# Patient Record
Sex: MALE | Employment: OTHER | URBAN - METROPOLITAN AREA
[De-identification: names, ages, dates, MRNs, and addresses within clinical notes are randomized per-mention and may not be internally consistent; named-entity substitution may affect disease eponyms.]

---

## 2020-11-24 ENCOUNTER — THERAPY VISIT (OUTPATIENT)
Dept: PHYSICAL THERAPY | Facility: CLINIC | Age: 70
End: 2020-11-24
Payer: OTHER MISCELLANEOUS

## 2020-11-24 ENCOUNTER — MEDICAL CORRESPONDENCE (OUTPATIENT)
Dept: PHYSICAL THERAPY | Facility: CLINIC | Age: 70
End: 2020-11-24

## 2020-11-24 DIAGNOSIS — M54.2 NECK PAIN: Primary | ICD-10-CM

## 2020-11-24 PROCEDURE — 97530 THERAPEUTIC ACTIVITIES: CPT | Mod: GP | Performed by: PHYSICAL THERAPIST

## 2020-11-24 PROCEDURE — 97110 THERAPEUTIC EXERCISES: CPT | Mod: GP | Performed by: PHYSICAL THERAPIST

## 2020-11-24 PROCEDURE — 97161 PT EVAL LOW COMPLEX 20 MIN: CPT | Mod: GP | Performed by: PHYSICAL THERAPIST
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
## 2020-11-29 PROBLEM — M54.2 NECK PAIN: Status: ACTIVE | Noted: 2020-11-29

## 2020-12-07 ENCOUNTER — THERAPY VISIT (OUTPATIENT)
Dept: PHYSICAL THERAPY | Facility: CLINIC | Age: 70
End: 2020-12-07
Payer: OTHER MISCELLANEOUS

## 2020-12-07 DIAGNOSIS — M54.2 NECK PAIN: Primary | ICD-10-CM

## 2020-12-07 PROCEDURE — 97530 THERAPEUTIC ACTIVITIES: CPT | Mod: GP | Performed by: PHYSICAL THERAPIST

## 2020-12-07 PROCEDURE — 97110 THERAPEUTIC EXERCISES: CPT | Mod: GP | Performed by: PHYSICAL THERAPIST

## 2020-12-07 PROCEDURE — 97140 MANUAL THERAPY 1/> REGIONS: CPT | Mod: GP | Performed by: PHYSICAL THERAPIST

## 2020-12-10 ENCOUNTER — THERAPY VISIT (OUTPATIENT)
Dept: PHYSICAL THERAPY | Facility: CLINIC | Age: 70
End: 2020-12-10
Payer: OTHER MISCELLANEOUS

## 2020-12-10 DIAGNOSIS — M54.2 NECK PAIN: Primary | ICD-10-CM

## 2020-12-10 PROCEDURE — 97140 MANUAL THERAPY 1/> REGIONS: CPT | Mod: GP | Performed by: PHYSICAL THERAPIST

## 2020-12-10 PROCEDURE — 97530 THERAPEUTIC ACTIVITIES: CPT | Mod: GP | Performed by: PHYSICAL THERAPIST

## 2020-12-14 ENCOUNTER — THERAPY VISIT (OUTPATIENT)
Dept: PHYSICAL THERAPY | Facility: CLINIC | Age: 70
End: 2020-12-14
Payer: OTHER MISCELLANEOUS

## 2020-12-14 DIAGNOSIS — M54.2 NECK PAIN: Primary | ICD-10-CM

## 2020-12-14 PROCEDURE — 97140 MANUAL THERAPY 1/> REGIONS: CPT | Mod: GP | Performed by: PHYSICAL THERAPIST

## 2020-12-14 PROCEDURE — 97530 THERAPEUTIC ACTIVITIES: CPT | Mod: GP | Performed by: PHYSICAL THERAPIST

## 2020-12-21 ENCOUNTER — THERAPY VISIT (OUTPATIENT)
Dept: PHYSICAL THERAPY | Facility: CLINIC | Age: 70
End: 2020-12-21
Payer: OTHER MISCELLANEOUS

## 2020-12-21 DIAGNOSIS — M54.2 NECK PAIN: ICD-10-CM

## 2020-12-21 PROCEDURE — 97140 MANUAL THERAPY 1/> REGIONS: CPT | Mod: GP | Performed by: PHYSICAL THERAPY ASSISTANT

## 2020-12-21 PROCEDURE — 97110 THERAPEUTIC EXERCISES: CPT | Mod: GP | Performed by: PHYSICAL THERAPY ASSISTANT

## 2020-12-28 ENCOUNTER — THERAPY VISIT (OUTPATIENT)
Dept: PHYSICAL THERAPY | Facility: CLINIC | Age: 70
End: 2020-12-28
Payer: OTHER MISCELLANEOUS

## 2020-12-28 DIAGNOSIS — M54.2 NECK PAIN: ICD-10-CM

## 2020-12-28 PROCEDURE — 97530 THERAPEUTIC ACTIVITIES: CPT | Mod: GP | Performed by: PHYSICAL THERAPY ASSISTANT

## 2020-12-28 PROCEDURE — 97110 THERAPEUTIC EXERCISES: CPT | Mod: GP | Performed by: PHYSICAL THERAPY ASSISTANT

## 2021-01-08 ENCOUNTER — TELEPHONE (OUTPATIENT)
Dept: PHYSICAL THERAPY | Facility: CLINIC | Age: 71
End: 2021-01-08

## 2021-01-08 DIAGNOSIS — M54.2 NECK PAIN: ICD-10-CM

## 2021-01-11 ENCOUNTER — THERAPY VISIT (OUTPATIENT)
Dept: PHYSICAL THERAPY | Facility: CLINIC | Age: 71
End: 2021-01-11
Payer: OTHER MISCELLANEOUS

## 2021-01-11 DIAGNOSIS — M54.2 NECK PAIN: Primary | ICD-10-CM

## 2021-01-11 PROCEDURE — 97530 THERAPEUTIC ACTIVITIES: CPT | Mod: GP | Performed by: PHYSICAL THERAPIST

## 2021-01-11 PROCEDURE — 97140 MANUAL THERAPY 1/> REGIONS: CPT | Mod: GP | Performed by: PHYSICAL THERAPIST

## 2021-01-15 ENCOUNTER — THERAPY VISIT (OUTPATIENT)
Dept: PHYSICAL THERAPY | Facility: CLINIC | Age: 71
End: 2021-01-15
Payer: OTHER MISCELLANEOUS

## 2021-01-15 DIAGNOSIS — M54.2 NECK PAIN: Primary | ICD-10-CM

## 2021-01-15 PROCEDURE — 97530 THERAPEUTIC ACTIVITIES: CPT | Mod: GP | Performed by: PHYSICAL THERAPIST

## 2021-01-15 PROCEDURE — 97140 MANUAL THERAPY 1/> REGIONS: CPT | Mod: GP | Performed by: PHYSICAL THERAPIST

## 2021-01-18 ENCOUNTER — THERAPY VISIT (OUTPATIENT)
Dept: PHYSICAL THERAPY | Facility: CLINIC | Age: 71
End: 2021-01-18
Payer: OTHER MISCELLANEOUS

## 2021-01-18 DIAGNOSIS — M54.2 NECK PAIN: Primary | ICD-10-CM

## 2021-01-18 PROCEDURE — 97530 THERAPEUTIC ACTIVITIES: CPT | Mod: GP | Performed by: PHYSICAL THERAPIST

## 2021-01-18 PROCEDURE — 97140 MANUAL THERAPY 1/> REGIONS: CPT | Mod: GP | Performed by: PHYSICAL THERAPIST

## 2021-01-21 ENCOUNTER — THERAPY VISIT (OUTPATIENT)
Dept: PHYSICAL THERAPY | Facility: CLINIC | Age: 71
End: 2021-01-21
Payer: OTHER MISCELLANEOUS

## 2021-01-21 DIAGNOSIS — M54.2 NECK PAIN: Primary | ICD-10-CM

## 2021-01-21 PROCEDURE — 97530 THERAPEUTIC ACTIVITIES: CPT | Mod: GP | Performed by: PHYSICAL THERAPIST

## 2021-01-21 PROCEDURE — 97140 MANUAL THERAPY 1/> REGIONS: CPT | Mod: GP | Performed by: PHYSICAL THERAPIST

## 2021-01-25 ENCOUNTER — THERAPY VISIT (OUTPATIENT)
Dept: PHYSICAL THERAPY | Facility: CLINIC | Age: 71
End: 2021-01-25
Payer: OTHER MISCELLANEOUS

## 2021-01-25 DIAGNOSIS — M54.2 NECK PAIN: Primary | ICD-10-CM

## 2021-01-25 PROCEDURE — 97140 MANUAL THERAPY 1/> REGIONS: CPT | Mod: GP | Performed by: PHYSICAL THERAPIST

## 2021-01-25 NOTE — PROGRESS NOTES
DISCHARGE REPORT    Progress reporting period is from [id number] to [id number]    SUBJECTIVE  Subjective changes noted by patient:  He returns to work tomorrow and is confident that it will go well. He is able to turn head normally. He continues to go to [facility]  6 days a week.      .     Initial Pain level: 8/10.   Changes in function:  Yes (See Goal flowsheet attached for changes in current functional level)  Adverse reaction to treatment or activity: None    OBJECTIVE  Objective: CSROM: all are wnl. UE//ROM is wnl.       ASSESSMENT/PLAN  Updated problem list and treatment plan:   Diagnosis 1:  Neck pain    STG/LTGs have been met or progress has been made towards goals:  Yes (See Goal flow sheet completed today.)  Assessment of Progress: The patient has met all of their long term goals.  Self Management Plans:  Patient is independent in a home treatment program.  Patient is independent in self management of symptoms.    Aki continues to require the following intervention to meet STG and LTG's:  PT intervention is no longer required to meet STG/LTG.    Recommendations:  This patient is ready to be discharged from therapy and continue their home treatment program.

## 2021-01-25 NOTE — LETTER
[facility] ATHLETIC [facility] PHYSICAL THERAPY  [address]  [facility] [id number] [phone]    2021    Re: Aki Morocho   :   1950  MRN:  [id number]   REFERRING PHYSICIAN:   Parviz Minor    [facility] ATHLETIC [facility] PHYSICAL [facility]    Date of Initial Evaluation:  2020  Visits:  10  Reason for Referral:  Neck pain    DISCHARGE REPORT: Progress reporting period is from 2020 to 2021    SUBJECTIVE  Subjective changes noted by patient:  He returns to work tomorrow and is confident that it will go well. He is able to turn head normally. He continues to go to [facility]  6 days a week.        Initial Pain level: 8/10.   Changes in function:  Yes (See Goal flowsheet attached for changes in current functional level)  Adverse reaction to treatment or activity: None  OBJECTIVE  Objective: CSROM: all are wnl. UE//ROM is wnl.   ASSESSMENT/PLAN  Updated problem list and treatment plan:   Diagnosis 1:  Neck pain    STG/LTGs have been met or progress has been made towards goals:  Yes (See Goal flow sheet completed today.)  Assessment of Progress: The patient has met all of their long term goals.  Self Management Plans:  Patient is independent in a home treatment program.  Patient is independent in self management of symptoms.  Aki continues to require the following intervention to meet STG and LTG's:  PT intervention is no longer required to meet STG/LTG.  Recommendations:  This patient is ready to be discharged from therapy and continue their home treatment program.    Thank you for your referral.    INQUIRIES      Therapist:   Chhaya James, PT, Cert. MDT  [facility] ATHLETIC [facility] PHYSICAL THERAPY  [address]  [facility] [address]  Phone: [phone]  Fax: [phone]